# Patient Record
Sex: FEMALE | Race: WHITE | Employment: FULL TIME | URBAN - METROPOLITAN AREA
[De-identification: names, ages, dates, MRNs, and addresses within clinical notes are randomized per-mention and may not be internally consistent; named-entity substitution may affect disease eponyms.]

---

## 2023-05-17 ENCOUNTER — HOSPITAL ENCOUNTER (EMERGENCY)
Age: 48
Discharge: HOME | End: 2023-05-17
Payer: COMMERCIAL

## 2023-05-17 ENCOUNTER — APPOINTMENT (OUTPATIENT)
Dept: GENERAL RADIOLOGY | Age: 48
End: 2023-05-17
Payer: COMMERCIAL

## 2023-05-17 VITALS
OXYGEN SATURATION: 100 % | RESPIRATION RATE: 16 BRPM | HEART RATE: 83 BPM | TEMPERATURE: 99 F | HEIGHT: 67 IN | WEIGHT: 174.6 LBS | DIASTOLIC BLOOD PRESSURE: 78 MMHG | SYSTOLIC BLOOD PRESSURE: 132 MMHG | BODY MASS INDEX: 27.4 KG/M2

## 2023-05-17 DIAGNOSIS — S93.402A SPRAIN OF LEFT ANKLE, UNSPECIFIED LIGAMENT, INITIAL ENCOUNTER: Primary | ICD-10-CM

## 2023-05-17 DIAGNOSIS — S93.401A SPRAIN OF RIGHT ANKLE, UNSPECIFIED LIGAMENT, INITIAL ENCOUNTER: ICD-10-CM

## 2023-05-17 PROCEDURE — 73610 X-RAY EXAM OF ANKLE: CPT

## 2023-05-17 PROCEDURE — 99283 EMERGENCY DEPT VISIT LOW MDM: CPT

## 2023-05-17 RX ORDER — NAPROXEN 500 MG/1
500 TABLET ORAL 2 TIMES DAILY PRN
Qty: 30 TABLET | Refills: 0 | Status: SHIPPED | OUTPATIENT
Start: 2023-05-17

## 2023-05-17 ASSESSMENT — LIFESTYLE VARIABLES
HOW MANY STANDARD DRINKS CONTAINING ALCOHOL DO YOU HAVE ON A TYPICAL DAY: 3 OR 4
HOW OFTEN DO YOU HAVE A DRINK CONTAINING ALCOHOL: 4 OR MORE TIMES A WEEK

## 2023-05-17 ASSESSMENT — PAIN SCALES - GENERAL
PAINLEVEL_OUTOF10: 6
PAINLEVEL_OUTOF10: 8

## 2023-05-17 NOTE — ED PROVIDER NOTES
interpreted by the Emergency Department Physician in the absence of a cardiologist.  Please see their note for interpretation of EKG. RADIOLOGY:   Non-plain film images such as CT, Ultrasound and MRI are read by the radiologist. Plain radiographic images are visualized and preliminarily interpreted by the ED Provider with the below findings:    X-rays of the right ankle and left ankle personally reviewed and interpreted by self as no fracture or dislocation. Formal interpreted by radiology. Interpretation per the Radiologist below, if available at the time of this note:    XR ANKLE LEFT (MIN 3 VIEWS)   Final Result   Mild soft tissue swelling around the ankle anterior laterally with no acute   bony abnormality. XR ANKLE RIGHT (MIN 3 VIEWS)   Final Result   No acute fracture or dislocation. No results found. PAST MEDICAL HISTORY      has no past medical history on file. EMERGENCY DEPARTMENT COURSE and DIFFERENTIAL DIAGNOSIS/MDM:   Vitals:    Vitals:    05/17/23 1616 05/17/23 1630 05/17/23 1915   BP: (!) 149/90 121/82 132/78   Pulse: (!) 106 90 83   Resp: 19 18 16   Temp: 100.3 °F (37.9 °C) 99.6 °F (37.6 °C) 99 °F (37.2 °C)   TempSrc: Oral Oral Oral   SpO2: 98% 99% 100%   Weight: 174 lb 9.7 oz (79.2 kg)     Height: 5' 7\" (1.702 m)         Patient was given the following medications:  Medications - No data to display        Presents for lateral left ankle pain and posterior right ankle pain after injury. On exam, she has TTP, ecchymosis and edema lateral aspect of the left ankle. Also has pain in the posterior right ankle with movement. Her initial temp was 100.3 F. She denies feeling ill. Denies cough congestion rhinorrhea. Repeat temp was 99.6 F. She was outside in yard prior to arrival.  Initial HR was 106 but repeat normal.  Will obtain xrays bilateral ankles.   Offered tylenol or ibuprofen for pain but she declined      Differential diagnosis includes fracture,

## 2023-05-18 ENCOUNTER — TELEPHONE (OUTPATIENT)
Dept: ORTHOPEDIC SURGERY | Age: 48
End: 2023-05-18

## 2023-05-22 ENCOUNTER — OFFICE VISIT (OUTPATIENT)
Dept: ORTHOPEDIC SURGERY | Age: 48
End: 2023-05-22

## 2023-05-22 VITALS — HEIGHT: 67 IN | WEIGHT: 174 LBS | BODY MASS INDEX: 27.31 KG/M2

## 2023-05-22 DIAGNOSIS — S93.401A SPRAIN OF RIGHT ANKLE, UNSPECIFIED LIGAMENT, INITIAL ENCOUNTER: ICD-10-CM

## 2023-05-22 DIAGNOSIS — S93.402A SPRAIN OF LEFT ANKLE, UNSPECIFIED LIGAMENT, INITIAL ENCOUNTER: Primary | ICD-10-CM

## 2023-05-22 NOTE — PROGRESS NOTES
CHIEF COMPLAINT: Bilateral ankle pain/ Ankle sprain. DATE OF INJURY: 5/17/23    History:  Ms. Mathew Santiago is a 52 y.o. female presents today for the first visit for evaluation of a bilateral ankle injury which occurred when she was missed the last step while walking down her porch and fell. .  She is complaining of lateral ankle pain. She rates pain 6/10 on the right, 2/10 on the left. Pain increases with walking and decreases with rest and elevation. No numbness or tingling sensation, and no other complaints. She has used Aleve without significant relief. She has used ice with relief. Does feel little bit better now compared to last week. Patient's occupation is  at [employer]. [address] No past medical history on file. No past surgical history on file. Current Outpatient Medications on File Prior to Visit   Medication Sig Dispense Refill    naproxen (NAPROSYN) 500 MG tablet Take 1 tablet by mouth 2 times daily as needed for Pain (Patient not taking: Reported on 5/22/2023) 30 tablet 0     No current facility-administered medications on file prior to visit.        No Known Allergies    Social History     Socioeconomic History    Marital status: Single     Spouse name: Not on file    Number of children: Not on file    Years of education: Not on file    Highest education level: Not on file   Occupational History    Not on file   Tobacco Use    Smoking status: Every Day     Packs/day: 0.50     Types: Cigarettes    Smokeless tobacco: Never   Substance and Sexual Activity    Alcohol use: Yes     Comment: 4/5 days a week 3 drinks    Drug use: Never    Sexual activity: Not on file   Other Topics Concern    Not on file   Social History Narrative    Not on file     Social Determinants of Health     Financial Resource Strain: Not on file   Food Insecurity: Not on file   Transportation Needs: Not on file   Physical Activity: Not on file   Stress: Not on file   Social Connections: Not on file   Intimate